# Patient Record
Sex: MALE | Race: WHITE
[De-identification: names, ages, dates, MRNs, and addresses within clinical notes are randomized per-mention and may not be internally consistent; named-entity substitution may affect disease eponyms.]

---

## 2021-10-14 ENCOUNTER — HOSPITAL ENCOUNTER (EMERGENCY)
Dept: HOSPITAL 43 - DL.ED | Age: 56
Discharge: HOME | End: 2021-10-14
Payer: SELF-PAY

## 2021-10-14 DIAGNOSIS — H61.23: ICD-10-CM

## 2021-10-14 DIAGNOSIS — Z79.899: ICD-10-CM

## 2021-10-14 DIAGNOSIS — R07.89: Primary | ICD-10-CM

## 2021-10-14 LAB
ANION GAP SERPL CALC-SCNC: 13.8 MEQ/L (ref 7–13)
CHLORIDE SERPL-SCNC: 101 MMOL/L (ref 98–107)
SODIUM SERPL-SCNC: 139 MMOL/L (ref 136–145)

## 2021-10-14 NOTE — EDM.PDOC
ED HPI GENERAL MEDICAL PROBLEM





- General


Stated Complaint: NUMBNESS ON LEFT SIDE OF BODY


Time Seen by Provider: 10/14/21 01:25


Source of Information: Reports: Patient


History Limitations: Reports: No Limitations





- History of Present Illness


INITIAL COMMENTS - FREE TEXT/NARRATIVE: 





This 54 yo male patient reports to the ED due to numbness, nausea, and chest 

discomfort.  The patient reports he went to bed around 2200 andd was feeling 

normal. The patient reports he woke up about 2300 with nausea, numbness to left 

jaw, shoulder, arm and some chest discomfort.  The patient reports he took 

Nexium.  The patient reports he continues to have some numbness in the left side

of his neck and left ear and very minimal chest discomfort at time of exam.  


Onset Date: 10/13/21


Onset Time: 23:00


Duration: Constant, Improving


Location: Reports: Head, Neck, Chest


Quality: Reports: Ache, Dull


Severity: Moderate


Improves with: Reports: None


Worsens with: Reports: None


Context: Reports: Other


Associated Symptoms: Reports: Chest Pain


  ** Left Chest


Pain Score (Numeric/FACES): 2





- Related Data


                                    Allergies











Allergy/AdvReac Type Severity Reaction Status Date / Time


 


No Known Allergies Allergy   Verified 10/14/21 01:20











Home Meds: 


                                    Home Meds





Esomeprazole Magnesium [Nexium 24Hr]  10/14/21 [History]











ED ROS GENERAL





- Review of Systems


Review Of Systems: Comprehensive ROS is negative, except as noted in HPI.





ED EXAM, GENERAL





- Physical Exam


Exam: See Below


Exam Limited By: No Limitations


General Appearance: Alert, WD/WN, Anxious, Mild Distress


Eye Exam: Bilateral Eye: EOMI, Normal Inspection, PERRL


Ears: Hearing Grossly Normal, Other (Cerumen impaction bilateral canals)


Nose: Normal Inspection, Normal Mucosa, No Blood


Throat/Mouth: Normal Inspection, Normal Lips, Normal Teeth, Normal Gums, Normal 

Oropharynx, Normal Voice, No Airway Compromise


Head: Atraumatic, Normocephalic


Neck: Normal Inspection, Supple, Non-Tender, Full Range of Motion


Respiratory/Chest: No Respiratory Distress, Lungs Clear, Normal Breath Sounds, 

No Accessory Muscle Use, Chest Non-Tender


Cardiovascular: Normal Peripheral Pulses, Regular Rate, Rhythm, No Edema, No 

Gallop, No JVD, No Murmur, No Rub


GI/Abdominal: Normal Bowel Sounds, Soft, Non-Tender, No Organomegaly, No Diste

ntion, No Abnormal Bruit, No Mass


 (Male) Exam: Deferred


Rectal (Males) Exam: Deferred


Back Exam: Normal Inspection, Full Range of Motion, NT


Extremities: Normal Inspection, Normal Range of Motion, Non-Tender, Normal 

Capillary Refill, No Pedal Edema


Neurological: Alert, Oriented, CN II-XII Intact, Normal Cognition, Normal Gait, 

Normal Reflexes, No Motor/Sensory Deficits


Psychiatric: Normal Affect, Normal Mood


Skin Exam: Warm, Dry, Intact, Normal Color, No Rash


Lymphatic: No Adenopathy


  ** #1 Interpretation


EKG Date: 10/14/21


Time: 01:08


Rhythm: NSR


Rate (Beats/Min): 92


Axis: Normal


P-Wave: Present


QRS: Normal


ST-T: Normal


QT: Normal


Comparison: NA - No Prior EKG





Course





- Vital Signs


Last Recorded V/S: 


                                Last Vital Signs











Temp  98.7 F   10/14/21 01:21


 


Pulse  98   10/14/21 01:21


 


Resp  16   10/14/21 01:21


 


BP  140/98 H  10/14/21 01:21


 


Pulse Ox  98   10/14/21 01:21














- Orders/Labs/Meds


Orders: 


                               Active Orders 24 hr











 Category Date Time Status


 


 Blood Glucose Check, Bedside [RC] ONETIME Care  10/14/21 01:06 Active


 


 Chest 1V Frontal [CR] Urgent Exams  10/14/21 01:28 Ordered


 


 CULTURE BLOOD [BC] Stat Lab  10/14/21 01:15 Received


 


 DRUG SCREEN URINE BIORAD [URCHEM] Stat Lab  10/14/21 01:06 Ordered


 


 UA RFX TODD AND CULT IF INDIC [URIN] Urgent Lab  10/14/21 01:06 Ordered











Labs: 


                                Laboratory Tests











  10/14/21 10/14/21 10/14/21 Range/Units





  01:15 01:15 01:15 


 


WBC  7.1    (5.0-10.0)  10^3/uL


 


RBC  5.41    (4.6-6.2)  10^6/uL


 


Hgb  16.5    (14.0-18.0)  g/dL


 


Hct  47.6    (40.0-54.0)  %


 


MCV  88.0    ()  fL


 


MCH  30.5    (27.0-34.0)  pg


 


MCHC  34.7    (33.0-35.0)  g/dL


 


Plt Count  319    (150-450)  10^3/uL


 


Neut % (Auto)  61.0    (42.2-75.2)  %


 


Lymph % (Auto)  27.3    (20.5-50.1)  %


 


Mono % (Auto)  8.8 H    (2-8)  %


 


Eos % (Auto)  2.1    (1.0-3.0)  %


 


Baso % (Auto)  0.8    (0.0-1.0)  %


 


PT     (9.0-12.0)  SEC


 


INR     (0.9-1.2)  


 


Sodium   139   (136-145)  mmol/L


 


Potassium   3.8   (3.5-5.1)  mmol/L


 


Chloride   101   ()  mmol/L


 


Carbon Dioxide   28   (21-32)  mmol/L


 


Anion Gap   13.8 H   (7-13)  mEq/L


 


BUN   20 H   (7-18)  mg/dL


 


Creatinine   1.24   (0.70-1.30)  mg/dL


 


Est Cr Clr Drug Dosing   72.53   mL/min


 


Estimated GFR (MDRD)   > 60   


 


BUN/Creatinine Ratio   16.1   (No establ ref range)  


 


Glucose   130 H   (70-99)  mg/dL


 


Lactic Acid    0.8  (0.4-2.0)  mmol/L


 


Calcium   8.6   (8.5-10.1)  mg/dL


 


Total Bilirubin   0.5   (0.2-1.0)  mg/dL


 


AST   26   (15-37)  U/L


 


ALT   32   (16-63)  U/L


 


Alkaline Phosphatase   83   ()  U/L


 


Troponin I High Sens   7   (<=76)  pg/mL


 


Total Protein   7.4   (6.4-8.2)  g/dL


 


Albumin   3.9   (3.4-5.0)  g/dL


 


Globulin   3.5   


 


Albumin/Globulin Ratio   1.1   


 


Ethyl Alcohol   < 3   (0)  mg/dL














  10/14/21 Range/Units





  01:15 


 


WBC   (5.0-10.0)  10^3/uL


 


RBC   (4.6-6.2)  10^6/uL


 


Hgb   (14.0-18.0)  g/dL


 


Hct   (40.0-54.0)  %


 


MCV   ()  fL


 


MCH   (27.0-34.0)  pg


 


MCHC   (33.0-35.0)  g/dL


 


Plt Count   (150-450)  10^3/uL


 


Neut % (Auto)   (42.2-75.2)  %


 


Lymph % (Auto)   (20.5-50.1)  %


 


Mono % (Auto)   (2-8)  %


 


Eos % (Auto)   (1.0-3.0)  %


 


Baso % (Auto)   (0.0-1.0)  %


 


PT  9.9  (9.0-12.0)  SEC


 


INR  1.0  (0.9-1.2)  


 


Sodium   (136-145)  mmol/L


 


Potassium   (3.5-5.1)  mmol/L


 


Chloride   ()  mmol/L


 


Carbon Dioxide   (21-32)  mmol/L


 


Anion Gap   (7-13)  mEq/L


 


BUN   (7-18)  mg/dL


 


Creatinine   (0.70-1.30)  mg/dL


 


Est Cr Clr Drug Dosing   mL/min


 


Estimated GFR (MDRD)   


 


BUN/Creatinine Ratio   (No establ ref range)  


 


Glucose   (70-99)  mg/dL


 


Lactic Acid   (0.4-2.0)  mmol/L


 


Calcium   (8.5-10.1)  mg/dL


 


Total Bilirubin   (0.2-1.0)  mg/dL


 


AST   (15-37)  U/L


 


ALT   (16-63)  U/L


 


Alkaline Phosphatase   ()  U/L


 


Troponin I High Sens   (<=76)  pg/mL


 


Total Protein   (6.4-8.2)  g/dL


 


Albumin   (3.4-5.0)  g/dL


 


Globulin   


 


Albumin/Globulin Ratio   


 


Ethyl Alcohol   (0)  mg/dL











Meds: 


Medications














Discontinued Medications














Generic Name Dose Route Start Last Admin





  Trade Name Nghia  PRN Reason Stop Dose Admin


 


Aspirin  324 mg  10/14/21 01:11 





  Aspirin 81 Mg Tab.Chew  PO  10/14/21 01:12 





  ONETIME ONE  














Departure





- Departure


Time of Disposition: 02:08


Disposition: Home, Self-Care 01


Condition: Fair


Clinical Impression: 


 Nonspecific chest pain





Instructions:  Nonspecific Chest Pain, Adult, Easy-to-Read


Forms:  ED Department Discharge


Care Plan Goals: 


The patient was advised of the examination, lab and x-ray results during the 

visit. The patient was given an oral dose of Aspirin during the visit. The 

patient was encouraged to follow-up with his primary care facility for continued

 evaluation and management. If the patient has any additional symptoms or 

concerns, the patient should either return to the emergency department or visit 

his primary care facility. 





Sepsis Event Note (ED)





- Focused Exam


Vital Signs: 


                                   Vital Signs











  Temp Pulse Resp BP Pulse Ox


 


 10/14/21 01:21  98.7 F  98  16  140/98 H  98














- My Orders


Last 24 Hours: 


My Active Orders





10/14/21 01:06


Blood Glucose Check, Bedside [RC] ONETIME 


DRUG SCREEN URINE BIORAD [URCHEM] Stat 


UA RFX TODD AND CULT IF INDIC [URIN] Urgent 





10/14/21 01:15


CULTURE BLOOD [BC] Stat 





10/14/21 01:28


Chest 1V Frontal [CR] Urgent 














- Assessment/Plan


Last 24 Hours: 


My Active Orders





10/14/21 01:06


Blood Glucose Check, Bedside [RC] ONETIME 


DRUG SCREEN URINE BIORAD [URCHEM] Stat 


UA RFX TODD AND CULT IF INDIC [URIN] Urgent 





10/14/21 01:15


CULTURE BLOOD [BC] Stat 





10/14/21 01:28


Chest 1V Frontal [CR] Urgent

## 2021-10-14 NOTE — CR
PROCEDURE INFORMATION: 

Exam: XR Chest 

Exam date and time: 10/14/2021 1:50 AM 

Age: 55 years old 

Clinical indication: Other: Chest pain 



TECHNIQUE: 

Imaging protocol: XR of the chest. 

Views: 1 view. 



COMPARISON: 

No relevant prior studies available. 



FINDINGS: 

Lungs: Unremarkable. No consolidation. 

Pleural spaces: Unremarkable. No pleural effusion. No pneumothorax. 

Heart/Mediastinum: Unremarkable. No cardiomegaly. 

Bones/joints: Unremarkable. 



IMPRESSION: 

No acute findings.